# Patient Record
Sex: MALE | Race: OTHER | HISPANIC OR LATINO | URBAN - METROPOLITAN AREA
[De-identification: names, ages, dates, MRNs, and addresses within clinical notes are randomized per-mention and may not be internally consistent; named-entity substitution may affect disease eponyms.]

---

## 2018-06-20 ENCOUNTER — EMERGENCY (EMERGENCY)
Facility: HOSPITAL | Age: 43
LOS: 1 days | Discharge: HOME | End: 2018-06-20
Attending: EMERGENCY MEDICINE
Payer: SELF-PAY

## 2018-06-20 VITALS
DIASTOLIC BLOOD PRESSURE: 88 MMHG | RESPIRATION RATE: 18 BRPM | SYSTOLIC BLOOD PRESSURE: 142 MMHG | OXYGEN SATURATION: 98 % | HEART RATE: 78 BPM

## 2018-06-20 VITALS — HEIGHT: 66 IN | WEIGHT: 184.97 LBS

## 2018-06-20 PROCEDURE — 99283 EMERGENCY DEPT VISIT LOW MDM: CPT | Mod: 25

## 2018-06-20 PROCEDURE — 73140 X-RAY EXAM OF FINGER(S): CPT

## 2018-06-20 PROCEDURE — 73140 X-RAY EXAM OF FINGER(S): CPT | Mod: 26,LT

## 2018-06-20 PROCEDURE — 12002 RPR S/N/AX/GEN/TRNK2.6-7.5CM: CPT

## 2018-06-20 RX ORDER — CEPHALEXIN 500 MG
1 CAPSULE ORAL
Qty: 14 | Refills: 0 | OUTPATIENT
Start: 2018-06-20 | End: 2018-06-26

## 2018-06-20 RX ORDER — CEPHALEXIN 500 MG
500 CAPSULE ORAL EVERY 12 HOURS
Qty: 0 | Refills: 0 | Status: DISCONTINUED | OUTPATIENT
Start: 2018-06-20 | End: 2018-06-25

## 2018-06-20 RX ADMIN — Medication 500 MILLIGRAM(S): at 18:10

## 2018-06-20 NOTE — ED ADULT TRIAGE NOTE - CHIEF COMPLAINT QUOTE
pt states he cut his left middle finger while using machinery at work. bandage not removed at triage. last tetanus in 2017

## 2018-06-20 NOTE — ED PROVIDER NOTE - NS_EDPROVIDERDISPOUSERTYPE_ED_A_ED
Pt LOV with PCP 5/22/2017  No F/U with PCP scheduled   Medication last filled 5/1/2017  Medication placed and pended   Pharmacy Verified   Will send to pharmacy and close encounter     Attending Attestation (For Attendings USE Only)...

## 2018-06-20 NOTE — ED PROVIDER NOTE - ATTENDING CONTRIBUTION TO CARE
Pt. with Deep laceration to Left index finger/distal phalanx. Horizontal laceration across nail bed. Tip of finger is dangling. PA with suture the wound. Pt. advised about the possibility of poor healing and that he may loose the tip of his finger. We will do our best to re-attach finger and give the patient follow up with the Hand surgeon.

## 2018-06-20 NOTE — ED PROCEDURE NOTE - CPROC ED POST PROC CARE GUIDE1
Keep the cast/splint/dressing clean and dry./Instructed patient/caregiver regarding signs and symptoms of infection./Verbal/written post procedure instructions were given to patient/caregiver./Elevate the injured extremity as instructed./Instructed patient/caregiver to follow-up with primary care physician.
Keep the cast/splint/dressing clean and dry./Instructed patient/caregiver regarding signs and symptoms of infection./Verbal/written post procedure instructions were given to patient/caregiver./Instructed patient/caregiver to follow-up with primary care physician.

## 2018-06-20 NOTE — ED PROVIDER NOTE - MEDICAL DECISION MAKING DETAILS
avulsion near amputation of the left index finger secondary to raffy hammer.   digital block. irrigation. antibiotic

## 2018-06-20 NOTE — ED PROVIDER NOTE - OBJECTIVE STATEMENT
43 year old  presenting with laceration and near amputation of the distal left index finger after loosing control of the raffy hammer and having it hit his finger. pt states his last tetatnus was 20017. has not taken anything for pain. pain to the finger and pain with movement. 43 year old  presenting with laceration and near amputation of the distal left 3rdfinger after loosing control of the raffy hammer and having it hit his finger. pt states his last tetatnus was 20017. has not taken anything for pain. pain to the finger and pain with movement.

## 2018-06-20 NOTE — ED ADULT NURSE NOTE - OBJECTIVE STATEMENT
received pt AOx4 in supertrack, c/o lac to left middle finger while using raffy hammer @ work, pt states last tetnus was last year 2017. resp even unlabored, in no distress, MAEx4, neuro intact. will continue to monitor

## 2018-06-20 NOTE — ED PROCEDURE NOTE - CPROC ED LACER REPAIR DETAIL1
The wound was explored to base in bloodless field./All foreign material was removed./Multiple flaps were aligned.

## 2018-06-20 NOTE — ED PROVIDER NOTE - SKIN WOUND TYPE
avulsion(s)/near amputation of the distal phalenx on the right index finger near amputation of the distal phalenx on the right 3rd digitfinger/avulsion(s)

## 2018-07-03 ENCOUNTER — EMERGENCY (EMERGENCY)
Facility: HOSPITAL | Age: 43
LOS: 1 days | Discharge: HOME | End: 2018-07-03
Attending: EMERGENCY MEDICINE | Admitting: EMERGENCY MEDICINE
Payer: SELF-PAY

## 2018-07-03 VITALS
DIASTOLIC BLOOD PRESSURE: 84 MMHG | RESPIRATION RATE: 18 BRPM | SYSTOLIC BLOOD PRESSURE: 136 MMHG | HEART RATE: 70 BPM | TEMPERATURE: 98 F | OXYGEN SATURATION: 99 %

## 2018-07-03 VITALS — HEIGHT: 65 IN | WEIGHT: 179.9 LBS

## 2018-07-03 PROCEDURE — G0463: CPT

## 2018-07-03 NOTE — ED PROVIDER NOTE - OBJECTIVE STATEMENT
44 yo M presented to ED for suture removal- pt had sutures placed approx 2 weeks ago. Pt also with distal phalanx fx, in splint, completed abx and is pending hand f/u.

## 2018-07-03 NOTE — ED ADULT TRIAGE NOTE - CHIEF COMPLAINT QUOTE
'I need to have my stitches taken out, I had them done here 6/20/2018." Pt offers no other complaints.

## 2018-07-03 NOTE — ED PROVIDER NOTE - ATTENDING CONTRIBUTION TO CARE
44 y/o Male seen and evaluated with ACP  Presents to ED for suture removal? v. check  placed x 2 days ago  returns as instructed for wound check  vitals reviewed, exam as documented  healing well, f/u as instructed
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
## 2025-04-22 NOTE — ED ADULT TRIAGE NOTE - CCCP TRG CHIEF CMPLNT
Please transfer [employer] ICD remotes from [facility] to Dr Adler, should have remote in June 2025 (she has follow up in September)  Thank you,      left middle finger/lacerations